# Patient Record
Sex: MALE | URBAN - METROPOLITAN AREA
[De-identification: names, ages, dates, MRNs, and addresses within clinical notes are randomized per-mention and may not be internally consistent; named-entity substitution may affect disease eponyms.]

---

## 2023-10-04 ENCOUNTER — CLAIMS CREATED FROM THE CLAIM WINDOW (OUTPATIENT)
Dept: URBAN - METROPOLITAN AREA CLINIC 4 | Facility: CLINIC | Age: 48
End: 2023-10-04
Payer: COMMERCIAL

## 2023-10-04 ENCOUNTER — OUT OF OFFICE VISIT (OUTPATIENT)
Dept: URBAN - METROPOLITAN AREA SURGERY CENTER 8 | Facility: SURGERY CENTER | Age: 48
End: 2023-10-04
Payer: COMMERCIAL

## 2023-10-04 DIAGNOSIS — Z12.11 COLON CANCER SCREENING: ICD-10-CM

## 2023-10-04 DIAGNOSIS — D12.2 BENIGN NEOPLASM OF ASCENDING COLON: ICD-10-CM

## 2023-10-04 DIAGNOSIS — K64.0 GRADE I HEMORRHOIDS: ICD-10-CM

## 2023-10-04 DIAGNOSIS — K63.5 COLONIC POLYPS: ICD-10-CM

## 2023-10-04 DIAGNOSIS — D12.2 ADENOMA OF ASCENDING COLON: ICD-10-CM

## 2023-10-04 PROCEDURE — 88305 TISSUE EXAM BY PATHOLOGIST: CPT | Performed by: PATHOLOGY

## 2023-10-04 PROCEDURE — 45385 COLONOSCOPY W/LESION REMOVAL: CPT | Performed by: INTERNAL MEDICINE

## 2023-10-04 PROCEDURE — G8907 PT DOC NO EVENTS ON DISCHARG: HCPCS | Performed by: INTERNAL MEDICINE

## 2023-10-04 PROCEDURE — 00811 ANES LWR INTST NDSC NOS: CPT | Performed by: NURSE ANESTHETIST, CERTIFIED REGISTERED

## 2024-01-09 ENCOUNTER — OFFICE VISIT (OUTPATIENT)
Dept: URBAN - METROPOLITAN AREA CLINIC 31 | Facility: CLINIC | Age: 49
End: 2024-01-09
Payer: COMMERCIAL

## 2024-01-09 ENCOUNTER — DASHBOARD ENCOUNTERS (OUTPATIENT)
Age: 49
End: 2024-01-09

## 2024-01-09 VITALS
OXYGEN SATURATION: 100 % | WEIGHT: 139.6 LBS | HEIGHT: 62 IN | DIASTOLIC BLOOD PRESSURE: 80 MMHG | HEART RATE: 78 BPM | SYSTOLIC BLOOD PRESSURE: 122 MMHG | BODY MASS INDEX: 25.69 KG/M2

## 2024-01-09 DIAGNOSIS — R14.0 BLOATING AND GAS: ICD-10-CM

## 2024-01-09 DIAGNOSIS — K64.8 INTERNAL HEMORRHOIDS: ICD-10-CM

## 2024-01-09 DIAGNOSIS — R68.81 EARLY SATIETY: ICD-10-CM

## 2024-01-09 PROBLEM — 442076002: Status: ACTIVE | Noted: 2024-01-09

## 2024-01-09 PROCEDURE — 99213 OFFICE O/P EST LOW 20 MIN: CPT | Performed by: INTERNAL MEDICINE

## 2024-01-09 NOTE — HPI-BLOATING/GAS
Patient admits recent onset of bloating/gas. He states his symptoms started about a year ago. Patient states before having his procedure he noticed he started passing a lot of gas. Since his procedure, he states his symptoms has worsen and are more present during the evening and at night. Patient denies taking any OTC or prescribe medications for relief  of his symptoms. Patient states alcohol and peanuts may triggers his symptoms but is not significally sure what may trigger his symptoms.

## 2024-01-09 NOTE — HPI-COLONOSCOPY FOLLOWUP
Patient presents today for a follow up from his colonoscopy. He denies any complications after his procedure. Patient states after the procedure he develop symptoms of bloating/gas. He reports before having his procedure he noticed he started passing a lot of gas. Currently reports 1 bowel movements per day, without strain. His stools are normal without the presence of blood, mucus, and melena. Denies any pruritus ani or rectal pain.  He has flatulence since the past one year   He has early satiety since the past few years

## 2024-01-10 LAB
IMMUNOGLOBULIN A: 180
INTERPRETATION: (no result)
TISSUE TRANSGLUTAMINASE AB, IGA: <1

## 2024-01-19 ENCOUNTER — OFFICE VISIT (OUTPATIENT)
Dept: URBAN - METROPOLITAN AREA SURGERY CENTER 8 | Facility: SURGERY CENTER | Age: 49
End: 2024-01-19

## 2024-02-08 ENCOUNTER — OV EP (OUTPATIENT)
Dept: URBAN - METROPOLITAN AREA CLINIC 33 | Facility: CLINIC | Age: 49
End: 2024-02-08